# Patient Record
Sex: MALE | URBAN - METROPOLITAN AREA
[De-identification: names, ages, dates, MRNs, and addresses within clinical notes are randomized per-mention and may not be internally consistent; named-entity substitution may affect disease eponyms.]

---

## 2023-12-29 ENCOUNTER — APPOINTMENT (OUTPATIENT)
Dept: URBAN - METROPOLITAN AREA CLINIC 232 | Age: 48
Setting detail: DERMATOLOGY
End: 2023-12-30

## 2023-12-29 VITALS — HEIGHT: 69 IN | WEIGHT: 215 LBS

## 2023-12-29 DIAGNOSIS — L20.9 ATOPIC DERMATITIS, UNSPECIFIED: ICD-10-CM

## 2023-12-29 PROCEDURE — OTHER COUNSELING: OTHER

## 2023-12-29 PROCEDURE — OTHER PRESCRIPTION: OTHER

## 2023-12-29 PROCEDURE — OTHER MIPS QUALITY: OTHER

## 2023-12-29 PROCEDURE — OTHER DUPIXENT INITIATION: OTHER

## 2023-12-29 PROCEDURE — OTHER TREATMENT REGIMEN: OTHER

## 2023-12-29 PROCEDURE — 99204 OFFICE O/P NEW MOD 45 MIN: CPT

## 2023-12-29 RX ORDER — DUPILUMAB 300 MG/2ML
INJECTION, SOLUTION SUBCUTANEOUS
Qty: 1 | Refills: 10 | Status: ERX | COMMUNITY
Start: 2023-12-29

## 2023-12-29 RX ORDER — DUPILUMAB 300 MG/2ML
INJECTION, SOLUTION SUBCUTANEOUS
Qty: 2 | Refills: 0 | Status: ERX

## 2023-12-29 RX ORDER — CLOBETASOL PROPIONATE 0.5 MG/G
OINTMENT TOPICAL
Qty: 45 | Refills: 2 | Status: CANCELLED

## 2023-12-29 ASSESSMENT — LOCATION SIMPLE DESCRIPTION DERM
LOCATION SIMPLE: RIGHT PRETIBIAL REGION
LOCATION SIMPLE: RIGHT FOREARM
LOCATION SIMPLE: LEFT PRETIBIAL REGION
LOCATION SIMPLE: LEFT FOREARM
LOCATION SIMPLE: LEFT HAND
LOCATION SIMPLE: RIGHT HAND

## 2023-12-29 ASSESSMENT — LOCATION DETAILED DESCRIPTION DERM
LOCATION DETAILED: RIGHT PROXIMAL PRETIBIAL REGION
LOCATION DETAILED: LEFT THENAR EMINENCE
LOCATION DETAILED: LEFT PROXIMAL PRETIBIAL REGION
LOCATION DETAILED: LEFT PROXIMAL DORSAL FOREARM
LOCATION DETAILED: RIGHT PROXIMAL DORSAL FOREARM
LOCATION DETAILED: RIGHT ULNAR PALM

## 2023-12-29 ASSESSMENT — ITCH NUMERIC RATING SCALE: HOW SEVERE IS YOUR ITCHING?: 8

## 2023-12-29 ASSESSMENT — BSA ECZEMA: % BODY COVERED IN ECZEMA: 10

## 2023-12-29 ASSESSMENT — LOCATION ZONE DERM
LOCATION ZONE: LEG
LOCATION ZONE: HAND
LOCATION ZONE: ARM

## 2023-12-29 NOTE — PROCEDURE: TREATMENT REGIMEN
Initiate Treatment: clobetasol 0.05 % topical ointment \\nSig: Apply to the rash on affected areas of hands once daily.
Detail Level: Zone

## 2023-12-29 NOTE — HPI: RASH
What Type Of Note Output Would You Prefer (Optional)?: Standard Output
Is The Patient Presenting As Previously Scheduled?: Yes
How Severe Is Your Rash?: mild
Is This A New Presentation, Or A Follow-Up?: Rash
Additional History: Pt moved here from [place] and he needs to re-establish care. Pt has previously been on Dupixent and it worked well.